# Patient Record
Sex: FEMALE | Race: WHITE | NOT HISPANIC OR LATINO | Employment: UNEMPLOYED | URBAN - METROPOLITAN AREA
[De-identification: names, ages, dates, MRNs, and addresses within clinical notes are randomized per-mention and may not be internally consistent; named-entity substitution may affect disease eponyms.]

---

## 2023-11-17 NOTE — PROGRESS NOTES
"New GYN problem visit  SUBJECTIVE  Sima Sánchez is a 63 y.o. female here for new Gyn problem visit  H/o PMB, pelvic ultrasound done in 2023, with normal sized uterus and thickened endometrial lining  H/o one episode of brownish blood , denies any ongoing bleeding or pelvic ain  Family H/o breast cancer in father, grandmother with colon cancer    GynHx:  Menopause: since 53 years  Postmenopausal sx: none  STIs: denies  Sexual Activity: none      [unfilled]  No past medical history on file.   Past Surgical History:   Procedure Laterality Date     SECTION, CLASSIC  2015     Section    DILATION AND CURETTAGE OF UTERUS  2015    Dilation And Curettage Of Cervical Stump    TUBAL LIGATION  2015    Tubal Ligation        OBJECTIVE  /90   Ht 1.626 m (5' 4\")   Wt 68.9 kg (152 lb)   Breastfeeding No   BMI 26.09 kg/m²      General:   Alert and oriented, in no acute distress   Neck: Supple. No visible thyromegaly.    Abdomen: Soft, non-tender, without masses or organomegaly   Skin: Dry and warm, no lesions noted   Musculoskeletal:  Normal range of motion, normal gait , no muscle weakness   Psych Normal affect. Normal mood.      Problem List Items Addressed This Visit       Endometrial thickening on ultrasound - Primary    Relevant Orders    US PELVIS TRANSABDOMINAL WITH TRANSVAGINAL    PMB (postmenopausal bleeding)    Relevant Orders    US PELVIS TRANSABDOMINAL WITH TRANSVAGINAL     Other Visit Diagnoses       Ovarian cyst, complex        Pelvic pain in female        Abnormal uterine bleeding (AUB)               IMPRESSIONS:  Recommend EMB to r/o endometrial neoplasia, patient declines EMB  Will order repeat pelvic ultrasound   She was advised to call with on going bleeding or pelvic pain      Mary Jo Whipple MD  "

## 2023-11-18 PROBLEM — R93.89 ENDOMETRIAL THICKENING ON ULTRASOUND: Status: ACTIVE | Noted: 2023-11-18

## 2023-11-18 PROBLEM — N95.0 PMB (POSTMENOPAUSAL BLEEDING): Status: ACTIVE | Noted: 2023-11-18

## 2023-11-20 ENCOUNTER — OFFICE VISIT (OUTPATIENT)
Dept: OBSTETRICS AND GYNECOLOGY | Facility: CLINIC | Age: 63
End: 2023-11-20
Payer: COMMERCIAL

## 2023-11-20 VITALS
SYSTOLIC BLOOD PRESSURE: 178 MMHG | WEIGHT: 152 LBS | BODY MASS INDEX: 25.95 KG/M2 | DIASTOLIC BLOOD PRESSURE: 90 MMHG | HEIGHT: 64 IN

## 2023-11-20 DIAGNOSIS — N95.0 PMB (POSTMENOPAUSAL BLEEDING): ICD-10-CM

## 2023-11-20 DIAGNOSIS — R93.89 ENDOMETRIAL THICKENING ON ULTRASOUND: Primary | ICD-10-CM

## 2023-11-20 DIAGNOSIS — R10.2 PELVIC PAIN IN FEMALE: ICD-10-CM

## 2023-11-20 DIAGNOSIS — N93.9 ABNORMAL UTERINE BLEEDING (AUB): ICD-10-CM

## 2023-11-20 DIAGNOSIS — N83.299 OVARIAN CYST, COMPLEX: ICD-10-CM

## 2023-11-20 PROCEDURE — 99204 OFFICE O/P NEW MOD 45 MIN: CPT | Performed by: OBSTETRICS & GYNECOLOGY

## 2023-11-20 RX ORDER — GABAPENTIN 400 MG/1
CAPSULE ORAL
COMMUNITY
Start: 2023-11-16

## 2023-11-20 RX ORDER — ALBUTEROL SULFATE 90 UG/1
2 AEROSOL, METERED RESPIRATORY (INHALATION) EVERY 4 HOURS PRN
COMMUNITY

## 2023-11-20 RX ORDER — BENZONATATE 100 MG/1
CAPSULE ORAL
COMMUNITY

## 2023-11-20 RX ORDER — AMLODIPINE BESYLATE 10 MG/1
1 TABLET ORAL DAILY
COMMUNITY
Start: 2015-02-04

## 2023-11-20 RX ORDER — ATORVASTATIN CALCIUM 20 MG/1
20 TABLET, FILM COATED ORAL DAILY
COMMUNITY
Start: 2023-10-11 | End: 2024-01-09

## 2023-12-04 ENCOUNTER — APPOINTMENT (OUTPATIENT)
Dept: RADIOLOGY | Facility: CLINIC | Age: 63
End: 2023-12-04
Payer: COMMERCIAL

## 2023-12-28 ENCOUNTER — APPOINTMENT (OUTPATIENT)
Dept: RADIOLOGY | Facility: CLINIC | Age: 63
End: 2023-12-28
Payer: COMMERCIAL

## 2024-05-13 ENCOUNTER — APPOINTMENT (OUTPATIENT)
Dept: OBSTETRICS AND GYNECOLOGY | Facility: CLINIC | Age: 64
End: 2024-05-13
Payer: COMMERCIAL

## 2024-07-14 ENCOUNTER — HOSPITAL ENCOUNTER (OUTPATIENT)
Dept: RADIOLOGY | Facility: EXTERNAL LOCATION | Age: 64
Discharge: HOME | End: 2024-07-14
Payer: COMMERCIAL

## 2024-07-14 DIAGNOSIS — R05.9 COUGH, UNSPECIFIED TYPE: ICD-10-CM

## 2024-07-14 DIAGNOSIS — R07.81 RIB PAIN ON LEFT SIDE: ICD-10-CM
